# Patient Record
(demographics unavailable — no encounter records)

---

## 2025-05-15 NOTE — PHYSICAL EXAM
[3+] : 3+ [Clear to Auscultation] : lungs were clear to auscultation bilaterally [Normal Gait and Station] : normal gait and station [Normal muscle strength, symmetry and tone of facial, head and neck musculature] : normal muscle strength, symmetry and tone of facial, head and neck musculature [Normal] : no cervical lymphadenopathy [Exposed Vessel] : left anterior vessel not exposed [Wheezing] : no wheezing [Increased Work of Breathing] : no increased work of breathing with use of accessory muscles and retractions [de-identified] : scalloping of tongue

## 2025-05-15 NOTE — DATA REVIEWED
[FreeTextEntry1] : Audiogram Indications  An audiogram was ordered to evaluate for eustachian tube dysfunction and/or conductive or sensorineural hearing loss given current symptoms/findings. Findings: Tymps: Type A bilaterally Audio: -8kHz The above results were independently interpreted by me and discussed with the family.  Parent used as independent historian given patient age and maturity.

## 2025-05-15 NOTE — ASSESSMENT
[FreeTextEntry1] : 10 year female with bilateral otalgia due to unknown etiology.   Discussed common causes of ear pain including ETD. No RAOM and ears clear today. Audio done and normal with normal tymps.  Wax cleaned but no evidence of eczematous changes or OE.    Evidence of TMJ pain on exam but different from her otalgia.  Recommend soft diet, NSAIDs, and warm compresses for two weeks.  No gum or ice chewing. Consider referral to dental if not improved.  ETD and AR treatment trial of flonase.  Discussed potential risks and benefits and alternatives to the use of this medication and discussed the dosing and administration of this medication.  ATH and scalloping of tongue c/w teeth grinding. ATH when present with chronic snoring and disrupted sleep always raises the for as yet undiagnosed obstructive sleep apnea (VERONICA). Given that, I recommend sleep observation. Discussed with the parent regarding sleep observation by going into their kids room a few times a week and watch them sleep for 5-10 min at varying times of the night to monitor snoring, apneas or gasping, signs of struggling to breath, restlessness, or other signs of SDB.  Sometimes we consider ordering a sleep study if highly concerned.    Discussed snoring vs UARS vs VERONICA.  Discussed that primary snoring is not harmful in and off itself but obstructive sleep apnea is different.  Often if we suspect SDB or VERONICA, we recommend evaluating and treating due to long term risk of quality of life issues, learning issues and, in severe cases, heart and lung problems.  Pending the results, and if VERONICA is suspected, and depending on severity, we discuss risks, alternatives, and benefits of surgical intervention such as adenotonsillectomy including alternatives of observation or CPAP.  Risks of adenotonsillectomy include, but are not limited to, bleeding, infection, scarring, voice changes, pain, dehydration, persistence of sleep apnea, and regrowth of adenoids.

## 2025-05-15 NOTE — CONSULT LETTER
[Dear  ___] : Dear  [unfilled], [Consult Letter:] : I had the pleasure of evaluating your patient, [unfilled]. [Please see my note below.] : Please see my note below. [Consult Closing:] : Thank you very much for allowing me to participate in the care of this patient.  If you have any questions, please do not hesitate to contact me. [Sincerely,] : Sincerely, [FreeTextEntry2] : Meka Wyman DO (PCP) [address] [facility], [facility] [address], [place], [place] [id number] [phone] [FreeTextEntry3] : Gavin Rowe MD Pediatric Otolaryngology/ Head & Neck Surgery [facility] [address] [place] [address] Tel [phone] Fax [phone]

## 2025-05-15 NOTE — REASON FOR VISIT
[Initial Consultation] : an initial consultation for [Mother] : mother [FreeTextEntry2] : bilateral ear fluid R>L

## 2025-05-15 NOTE — HISTORY OF PRESENT ILLNESS
[No Personal or Family History of Easy Bruising, Bleeding, or Issues with General Anesthesia] : No Personal or Family History of easy bruising, bleeding, or issues with general anesthesia [de-identified] : 5-15-25 10 year old girl presents for initial evaluation for bilateral ear fluid R>L. Reports current bilateral ear pressure/discomfort and muffled hearing.  Denies otorrhea, recent fevers or ear infections.  No history of recurrent ear infections.  No known speech delay. No nasal congestion or snoring issues. No throat/tonsil infections.  Eating/drinking without any difficulty.